# Patient Record
Sex: FEMALE | Race: WHITE | NOT HISPANIC OR LATINO | Employment: FULL TIME | URBAN - METROPOLITAN AREA
[De-identification: names, ages, dates, MRNs, and addresses within clinical notes are randomized per-mention and may not be internally consistent; named-entity substitution may affect disease eponyms.]

---

## 2023-04-22 LAB
ALANINE AMINOTRANSFERASE (SGPT) (U/L) IN SER/PLAS: 77 U/L (ref 7–45)
ALBUMIN (G/DL) IN SER/PLAS: 4.4 G/DL (ref 3.4–5)
ALKALINE PHOSPHATASE (U/L) IN SER/PLAS: 156 U/L (ref 33–110)
ANION GAP IN SER/PLAS: 12 MMOL/L (ref 10–20)
ANION GAP IN SER/PLAS: 14 MMOL/L (ref 10–20)
ASPARTATE AMINOTRANSFERASE (SGOT) (U/L) IN SER/PLAS: 44 U/L (ref 9–39)
BASOPHILS (10*3/UL) IN BLOOD BY AUTOMATED COUNT: 0.09 X10E9/L (ref 0–0.1)
BASOPHILS/100 LEUKOCYTES IN BLOOD BY AUTOMATED COUNT: 1.9 % (ref 0–2)
BILIRUBIN TOTAL (MG/DL) IN SER/PLAS: 0.4 MG/DL (ref 0–1.2)
CALCIUM (MG/DL) IN SER/PLAS: 10.2 MG/DL (ref 8.6–10.6)
CALCIUM (MG/DL) IN SER/PLAS: 10.2 MG/DL (ref 8.6–10.6)
CARBON DIOXIDE, TOTAL (MMOL/L) IN SER/PLAS: 23 MMOL/L (ref 21–32)
CARBON DIOXIDE, TOTAL (MMOL/L) IN SER/PLAS: 24 MMOL/L (ref 21–32)
CHLORIDE (MMOL/L) IN SER/PLAS: 110 MMOL/L (ref 98–107)
CHLORIDE (MMOL/L) IN SER/PLAS: 110 MMOL/L (ref 98–107)
CREATININE (MG/DL) IN SER/PLAS: 0.93 MG/DL (ref 0.5–1.05)
CREATININE (MG/DL) IN SER/PLAS: 0.95 MG/DL (ref 0.5–1.05)
EOSINOPHILS (10*3/UL) IN BLOOD BY AUTOMATED COUNT: 0.24 X10E9/L (ref 0–0.7)
EOSINOPHILS/100 LEUKOCYTES IN BLOOD BY AUTOMATED COUNT: 5.1 % (ref 0–6)
ERYTHROCYTE DISTRIBUTION WIDTH (RATIO) BY AUTOMATED COUNT: 13.2 % (ref 11.5–14.5)
ERYTHROCYTE MEAN CORPUSCULAR HEMOGLOBIN CONCENTRATION (G/DL) BY AUTOMATED: 31.4 G/DL (ref 32–36)
ERYTHROCYTE MEAN CORPUSCULAR VOLUME (FL) BY AUTOMATED COUNT: 95 FL (ref 80–100)
ERYTHROCYTES (10*6/UL) IN BLOOD BY AUTOMATED COUNT: 4.44 X10E12/L (ref 4–5.2)
ESTIMATED AVERAGE GLUCOSE FOR HBA1C: 111 MG/DL
FASTING GLUCOSE (MG/DL) IN SER/PLAS: 94 MG/DL (ref 74–99)
GFR FEMALE: 69 ML/MIN/1.73M2
GFR FEMALE: 71 ML/MIN/1.73M2
GLUCOSE (MG/DL) IN SER/PLAS: 98 MG/DL (ref 74–99)
GLUCOSE (MG/DL) IN SER/PLAS: 98 MG/DL (ref 74–99)
HEMATOCRIT (%) IN BLOOD BY AUTOMATED COUNT: 42 % (ref 36–46)
HEMOGLOBIN (G/DL) IN BLOOD: 13.2 G/DL (ref 12–16)
HEMOGLOBIN A1C/HEMOGLOBIN TOTAL IN BLOOD: 5.5 %
IMMATURE GRANULOCYTES/100 LEUKOCYTES IN BLOOD BY AUTOMATED COUNT: 0.2 % (ref 0–0.9)
INSULIN, FASTING: 10 UIU/ML (ref 3–25)
LEUKOCYTES (10*3/UL) IN BLOOD BY AUTOMATED COUNT: 4.8 X10E9/L (ref 4.4–11.3)
LYMPHOCYTES (10*3/UL) IN BLOOD BY AUTOMATED COUNT: 1.6 X10E9/L (ref 1.2–4.8)
LYMPHOCYTES/100 LEUKOCYTES IN BLOOD BY AUTOMATED COUNT: 33.7 % (ref 13–44)
MONOCYTES (10*3/UL) IN BLOOD BY AUTOMATED COUNT: 0.34 X10E9/L (ref 0.1–1)
MONOCYTES/100 LEUKOCYTES IN BLOOD BY AUTOMATED COUNT: 7.2 % (ref 2–10)
NEUTROPHILS (10*3/UL) IN BLOOD BY AUTOMATED COUNT: 2.47 X10E9/L (ref 1.2–7.7)
NEUTROPHILS/100 LEUKOCYTES IN BLOOD BY AUTOMATED COUNT: 51.9 % (ref 40–80)
NRBC (PER 100 WBCS) BY AUTOMATED COUNT: 0 /100 WBC (ref 0–0)
PLATELETS (10*3/UL) IN BLOOD AUTOMATED COUNT: 243 X10E9/L (ref 150–450)
POTASSIUM (MMOL/L) IN SER/PLAS: 4.6 MMOL/L (ref 3.5–5.3)
POTASSIUM (MMOL/L) IN SER/PLAS: 4.7 MMOL/L (ref 3.5–5.3)
PROTEIN TOTAL: 6.8 G/DL (ref 6.4–8.2)
SODIUM (MMOL/L) IN SER/PLAS: 141 MMOL/L (ref 136–145)
SODIUM (MMOL/L) IN SER/PLAS: 142 MMOL/L (ref 136–145)
THYROTROPIN (MIU/L) IN SER/PLAS BY DETECTION LIMIT <= 0.05 MIU/L: 1.48 MIU/L (ref 0.44–3.98)
UREA NITROGEN (MG/DL) IN SER/PLAS: 22 MG/DL (ref 6–23)
UREA NITROGEN (MG/DL) IN SER/PLAS: 22 MG/DL (ref 6–23)

## 2023-10-26 RX ORDER — SEMAGLUTIDE 2.68 MG/ML
INJECTION, SOLUTION SUBCUTANEOUS
COMMUNITY
Start: 2023-05-02 | End: 2023-10-31 | Stop reason: DRUGHIGH

## 2023-10-26 NOTE — TELEPHONE ENCOUNTER
Spoke with patient, name and  verified:     Patient called stating that a PA needs to be submitted.

## 2023-10-30 PROBLEM — R10.13 ABDOMINAL PAIN, EPIGASTRIC: Status: ACTIVE | Noted: 2023-10-30

## 2023-10-30 PROBLEM — E88.819 INSULIN RESISTANCE: Status: ACTIVE | Noted: 2023-10-30

## 2023-10-30 PROBLEM — F41.9 ANXIETY: Status: ACTIVE | Noted: 2023-10-30

## 2023-10-30 PROBLEM — M62.830 BACK MUSCLE SPASM: Status: ACTIVE | Noted: 2023-10-30

## 2023-10-30 PROBLEM — G89.29 BACK PAIN, CHRONIC: Status: ACTIVE | Noted: 2023-10-30

## 2023-10-30 PROBLEM — R63.5 WEIGHT GAIN: Status: ACTIVE | Noted: 2023-10-30

## 2023-10-30 PROBLEM — K21.9 GERD (GASTROESOPHAGEAL REFLUX DISEASE): Status: ACTIVE | Noted: 2023-10-30

## 2023-10-30 PROBLEM — M54.2 NECK PAIN ON LEFT SIDE: Status: ACTIVE | Noted: 2023-10-30

## 2023-10-30 PROBLEM — E66.3 OVERWEIGHT (BMI 25.0-29.9): Status: ACTIVE | Noted: 2023-10-30

## 2023-10-30 PROBLEM — K22.70 BARRETTS ESOPHAGUS: Status: ACTIVE | Noted: 2023-10-30

## 2023-10-30 PROBLEM — R79.89 ABNORMAL TSH: Status: ACTIVE | Noted: 2023-10-30

## 2023-10-30 PROBLEM — L65.9 HAIR LOSS: Status: ACTIVE | Noted: 2023-01-12

## 2023-10-30 PROBLEM — R06.00 DYSPNEA: Status: ACTIVE | Noted: 2023-10-30

## 2023-10-30 PROBLEM — M54.9 BACK PAIN, CHRONIC: Status: ACTIVE | Noted: 2023-10-30

## 2023-10-30 PROBLEM — K44.9 HERNIA, HIATAL: Status: ACTIVE | Noted: 2023-10-30

## 2023-10-30 PROBLEM — Z72.4 INAPPROPRIATE DIET AND EATING HABITS: Status: ACTIVE | Noted: 2023-10-30

## 2023-10-30 PROBLEM — E03.9 HYPOTHYROIDISM: Status: ACTIVE | Noted: 2023-10-30

## 2023-10-30 PROBLEM — R51.9 HEADACHE: Status: ACTIVE | Noted: 2023-10-30

## 2023-10-30 PROBLEM — L29.9 PRURITUS, UNSPECIFIED: Status: ACTIVE | Noted: 2023-01-12

## 2023-10-30 PROBLEM — M19.90 ARTHRITIS: Status: ACTIVE | Noted: 2023-10-30

## 2023-10-30 PROBLEM — G89.29 CHRONIC RADICULAR PAIN OF LOWER BACK: Status: ACTIVE | Noted: 2023-10-30

## 2023-10-30 PROBLEM — M54.16 CHRONIC RADICULAR PAIN OF LOWER BACK: Status: ACTIVE | Noted: 2023-10-30

## 2023-10-30 PROBLEM — E05.00 GRAVES DISEASE: Status: ACTIVE | Noted: 2023-10-30

## 2023-10-30 PROBLEM — M47.816 SPONDYLOSIS OF LUMBAR SPINE: Status: ACTIVE | Noted: 2023-10-30

## 2023-10-30 PROBLEM — M79.7 FIBROMYALGIA: Status: ACTIVE | Noted: 2023-10-30

## 2023-10-30 PROBLEM — R63.2 POLYPHAGIA: Status: ACTIVE | Noted: 2023-10-30

## 2023-10-30 PROBLEM — M79.18 MUSCULOSKELETAL PAIN: Status: ACTIVE | Noted: 2023-10-30

## 2023-10-30 PROBLEM — R13.10 ODYNOPHAGIA: Status: ACTIVE | Noted: 2023-10-30

## 2023-10-30 PROBLEM — N94.9 CERVICAL MOTION TENDERNESS: Status: ACTIVE | Noted: 2023-10-30

## 2023-10-30 PROBLEM — R00.2 PALPITATIONS: Status: ACTIVE | Noted: 2023-10-30

## 2023-10-30 PROBLEM — L57.0 ACTINIC KERATOSIS: Status: ACTIVE | Noted: 2023-01-12

## 2023-10-30 PROBLEM — H54.7 VISION DECREASED: Status: ACTIVE | Noted: 2023-10-30

## 2023-10-30 PROBLEM — R79.89 ELEVATED LFTS: Status: ACTIVE | Noted: 2023-10-30

## 2023-10-30 PROBLEM — M18.12 LOCALIZED PRIMARY OSTEOARTHRITIS OF CARPOMETACARPAL JOINT OF LEFT THUMB: Status: ACTIVE | Noted: 2023-10-30

## 2023-10-30 PROBLEM — D72.9 ABNORMAL WHITE BLOOD CELL (WBC): Status: ACTIVE | Noted: 2023-10-30

## 2023-10-30 RX ORDER — PHENTERMINE HYDROCHLORIDE 37.5 MG/1
0.5 TABLET ORAL 2 TIMES DAILY
COMMUNITY
Start: 2023-03-13 | End: 2024-01-26 | Stop reason: WASHOUT

## 2023-10-30 RX ORDER — DEXTROMETHORPHAN HYDROBROMIDE, GUAIFENESIN 5; 100 MG/5ML; MG/5ML
650 LIQUID ORAL EVERY 8 HOURS PRN
COMMUNITY
Start: 2023-07-12

## 2023-10-30 RX ORDER — LEVOTHYROXINE SODIUM 125 UG/1
1 TABLET ORAL DAILY
COMMUNITY
Start: 2017-05-08 | End: 2024-01-26 | Stop reason: WASHOUT

## 2023-10-30 RX ORDER — DICLOFENAC EPOLAMINE 0.01 G/1
SYSTEM TOPICAL 2 TIMES DAILY
COMMUNITY

## 2023-10-30 RX ORDER — LIFITEGRAST 50 MG/ML
1 SOLUTION/ DROPS OPHTHALMIC 2 TIMES DAILY
COMMUNITY

## 2023-10-30 RX ORDER — LEVOTHYROXINE SODIUM 88 UG/1
88 TABLET ORAL
COMMUNITY

## 2023-10-30 RX ORDER — SPIRONOLACTONE 50 MG/1
TABLET, FILM COATED ORAL
COMMUNITY
Start: 2021-07-08

## 2023-10-30 RX ORDER — SEMAGLUTIDE 1.34 MG/ML
1 INJECTION, SOLUTION SUBCUTANEOUS
COMMUNITY
End: 2023-10-31 | Stop reason: DRUGHIGH

## 2023-10-30 RX ORDER — PANTOPRAZOLE SODIUM 40 MG/1
40 TABLET, DELAYED RELEASE ORAL 2 TIMES DAILY
COMMUNITY

## 2023-10-30 RX ORDER — CLOBETASOL PROPIONATE 0.46 MG/ML
1 SOLUTION TOPICAL
COMMUNITY
Start: 2021-07-08

## 2023-10-30 RX ORDER — TOPIRAMATE 25 MG/1
3 TABLET ORAL 2 TIMES DAILY
COMMUNITY
Start: 2020-06-10 | End: 2023-11-24 | Stop reason: SDUPTHER

## 2023-10-30 RX ORDER — AMOXICILLIN 500 MG/1
500 CAPSULE ORAL 3 TIMES DAILY
COMMUNITY
Start: 2023-07-12

## 2023-10-30 RX ORDER — SEMAGLUTIDE 0.68 MG/ML
0.5 INJECTION, SOLUTION SUBCUTANEOUS
COMMUNITY
End: 2023-10-31 | Stop reason: DRUGHIGH

## 2023-10-31 ENCOUNTER — TELEMEDICINE (OUTPATIENT)
Dept: SURGERY | Facility: CLINIC | Age: 58
End: 2023-10-31
Payer: COMMERCIAL

## 2023-10-31 VITALS — BODY MASS INDEX: 27.9 KG/M2 | WEIGHT: 155 LBS

## 2023-10-31 DIAGNOSIS — Z72.4 INAPPROPRIATE DIET AND EATING HABITS: ICD-10-CM

## 2023-10-31 DIAGNOSIS — E88.819 INSULIN RESISTANCE: Primary | ICD-10-CM

## 2023-10-31 DIAGNOSIS — R63.2 POLYPHAGIA: ICD-10-CM

## 2023-10-31 DIAGNOSIS — E66.3 OVERWEIGHT (BMI 25.0-29.9): ICD-10-CM

## 2023-10-31 PROCEDURE — 99214 OFFICE O/P EST MOD 30 MIN: CPT

## 2023-10-31 PROCEDURE — 99214 OFFICE O/P EST MOD 30 MIN: CPT | Mod: 95

## 2023-10-31 RX ORDER — SEMAGLUTIDE 2.68 MG/ML
2 INJECTION, SOLUTION SUBCUTANEOUS
Qty: 3 ML | Refills: 2 | Status: SHIPPED | OUTPATIENT
Start: 2023-10-31 | End: 2024-01-26 | Stop reason: WASHOUT

## 2023-10-31 NOTE — PATIENT INSTRUCTIONS
# Insulin Resistance/Metabolic Syndrome:  - Your recent labs show insulin resistance of which a diagnosis of Metabolic Syndrome is supported   - Metabolic Syndrome is an important risk factor for the future development of Diabetes and/or Cardiovascular Disease.  - Metabolic Syndrome has also been associated with other obesity-related disorders including: Fatty Liver Disease (Steatosis, Fibrosis and Cirrhosis), Hepatocellular Carcinoma, Chronic Kidney Disease, Obstructive Sleep Apnea, Gout, Cognitive Decline and Dementia.   - Aggressive lifestyle modification focusing on weight reduction and increased physical activity is the primary therapy for the management of Metabolic Syndrome.  - Weight reduction is optimally achieved with a multimodality approach including diet, exercise and possible pharmacologic therapy.     # Diet Recommendations:  - Keep a food journal and log everything you eat and drink. You can use a phone applications like [employer], [employer], a notebook, or the provided meal calendar.   - Eat between 8810-0165 calories per day; meal plan provided to you this visit.  - Consume less than 100 carbohydrates per day. Examples of carbohydrates include: bread, pasta, cakes, cookies, rice, cereal, fruit drinks, juice, soda and fruit.   - Consume no more than 1 fruit per day.      - Eat 3 meals and 1 snack. Each meal should have 3-4 oz of protein and vegetables. Limit starches.  - Eat on a schedule and do not skip meals.     # Exercise Recommendations:   - Aim for 30 minutes per day, 5 days per week to start, with progression over several weeks to more vigorous intensity.   - Emphasize increasing duration, rather than intensity initially.   - Moderate-intensity physical activity includes:  - Brisk Walking  - Biking (slower than 10 MPH)  - Water Aerobics  - Vigorous housework (washing windows, vacuuming, mopping)  - Mowing the lawn (push mower)  - Gardening  - Ballroom dancing     # Medication:   - You have  been prescribed Ozempic for the treatment of Insulin Resistance and Metabolic Syndrome.  - Ozempic is a Glucagon-like peptide-1 (GLP-1) receptor agonist indicated as an adjunct to a reduced calorie diet and increased physical activity for chronic weight management in adults.   - This medication works by decreasing the appetite and increased feeling of fullness.   - Administer Ozempic once weekly, on the same day each week, at any time of day, with or without meals.   - Inject subcutaneously in the abdomen, thigh or upper arm.  - Possible Side Effects Include: Nausea, Diarrhea, Vomiting, Constipation, Abdominal Pain, Headache, Fatigue, Dyspepsia, Dizziness, Abdominal Distention, Hypoglycemia (in patients with Type II Diabetes), flatulence, gastroenteritis, and GERD.     *For Constipation--> take a fiber supplement or stool softener daily. Make sure you are drinking at least 64oz of water daily.  *For Nausea--> Eat small, frequent meals throughout the day. If nausea persists, please call the office.     Please call my , Bella, to schedule your follow up appointment at [phone]. Please call today to ensure your follow up appointment is scheduled at the appropriate interval.        Follow-up in 4-6 weeks.

## 2023-11-07 NOTE — TELEPHONE ENCOUNTER
Spoke with patient, name and  verified:     Patient is aware that Ozempic was denied by insurance and would like no know next steps going forward. Patient is suggesting trying to restart Topiramate.     Please advise.

## 2023-11-14 RX ORDER — SEMAGLUTIDE 2.68 MG/ML
INJECTION, SOLUTION SUBCUTANEOUS
Refills: 0 | OUTPATIENT
Start: 2023-11-14

## 2023-11-18 ENCOUNTER — PATIENT MESSAGE (OUTPATIENT)
Dept: SURGERY | Facility: HOSPITAL | Age: 58
End: 2023-11-18
Payer: COMMERCIAL

## 2023-11-18 DIAGNOSIS — R63.2 POLYPHAGIA: Primary | ICD-10-CM

## 2023-11-18 DIAGNOSIS — E66.3 OVERWEIGHT (BMI 25.0-29.9): ICD-10-CM

## 2023-11-24 RX ORDER — TOPIRAMATE 25 MG/1
50 TABLET ORAL 2 TIMES DAILY
Qty: 120 TABLET | Refills: 1 | Status: SHIPPED | OUTPATIENT
Start: 2023-11-24 | End: 2023-12-28 | Stop reason: SDUPTHER

## 2023-12-28 ENCOUNTER — TELEMEDICINE (OUTPATIENT)
Dept: SURGERY | Facility: CLINIC | Age: 58
End: 2023-12-28
Payer: COMMERCIAL

## 2023-12-28 ENCOUNTER — PATIENT MESSAGE (OUTPATIENT)
Dept: SURGERY | Facility: CLINIC | Age: 58
End: 2023-12-28

## 2023-12-28 VITALS — HEIGHT: 63 IN | BODY MASS INDEX: 28.7 KG/M2 | WEIGHT: 162 LBS

## 2023-12-28 DIAGNOSIS — R63.2 POLYPHAGIA: ICD-10-CM

## 2023-12-28 DIAGNOSIS — E66.3 OVERWEIGHT (BMI 25.0-29.9): ICD-10-CM

## 2023-12-28 DIAGNOSIS — E66.3 OVERWEIGHT (BMI 25.0-29.9): Primary | ICD-10-CM

## 2023-12-28 DIAGNOSIS — E88.819 INSULIN RESISTANCE: ICD-10-CM

## 2023-12-28 PROCEDURE — 99213 OFFICE O/P EST LOW 20 MIN: CPT

## 2023-12-28 RX ORDER — TOPIRAMATE 50 MG/1
50 TABLET, FILM COATED ORAL 2 TIMES DAILY
Qty: 60 TABLET | Refills: 1 | Status: SHIPPED | OUTPATIENT
Start: 2023-12-28 | End: 2024-01-03 | Stop reason: DRUGHIGH

## 2023-12-28 NOTE — ASSESSMENT & PLAN NOTE
Your recent lab results are consistent with features of insulin resistance risk, a disease process on the spectrum of conditions which can progress to type 2 diabetes  -counseled on lifestyle modification focusing on nutritional changes to reduce excess calorie intake, reduce ultra processed carbohydrates and added sugars in the diet, weight reduction, increased physical activity, improved sleep management and stress reduction are the primary therapies for the management of this diagnosis.  -dietary counseling for use of a controlled carbohydrate diet approach, emphasizing a focus on increased dietary protein, high fiber, whole food based carbohydrate sources (foods like vegetables, fruits, beans, lentils, 100% whole grain, etc).  -  Continue to strive to increase dietary protein at each meal.  Goal protein intake 30g per meal.  Balancing grams of carbohydrate and protein in 1:1 ratio at meals can help keep blood sugar stable after meals (for example 30g protein matched to 30-40g carbohydrate) - working with a clinical dietitian can help teach you about carbohydrate counting at meals to get you more comfortable with understanding how to appropriately track carbohydrates at meals.     -Exercise Counseling: Increase purposeful physical activity to improve insulin sensitivity.

## 2023-12-28 NOTE — PROGRESS NOTES
[facility]  Comprehensive Weight Management        Patient: Ruth Wynne  MRN: [id number]    Virtual or Telephone Consent     A telephone visit (audio or visual only) between the patient (at the originating site) and the provider (at the distant site) was utilized to provide this telehealth service. Verbal consent was requested and obtained from Ruth Wynne on this date, 12/28/2023 at 1:15 PM for a telehealth visit.     HPI   Ruth Wynne is a 58 y.o., female patient presenting for comprehensive weight management follow up visit.  Patient regularly sees and has established care with KANWAL Quiroga. Visit today for medication refill.  PMH - Obesity, Anxiety, Arthritis, Back Pain, Elevated LFT's, Fibromyalgia, GERD, Graves Disease, HH and Polyphagia.     Obesity Hx:  Onset of Obesity: adulthood  Previous Weight Management Attempted:  -Used to be on topiramate 75mg BID. Last visit endorsed not taking topiramate as consistently and experienced some weight regain.   -Phentermine was initiated last month, but Ruth states that she did not like the way it made her feel,. and she was having some chest pain along with exertion with activity. Stopped.   -Ozempic to address her Insulin Resistance. No longer covered by insurance    Most Weight Loss Success: with pharmacological adjunct to diet / exercise   Self-ID Dietary Challenge Areas: evening cravings / snacks   Frequency Added Sugar Beverages: denies  Exercise: 8K/day steps daily, minimal regular exercise     Metabolic/Clinical health risks associated with obesity & Factors influence Obesity Treatment:    -insulin resistance    Primary Medical Hx:  Patient Active Problem List   Diagnosis    Abdominal pain, epigastric    Abnormal TSH    Abnormal white blood cell (WBC)    Actinic keratosis    Anxiety    Arthritis    Back muscle spasm    Back pain, chronic    Fibromyalgia    Headache    Musculoskeletal pain    Neck pain on left side    Barretts esophagus     Cervical motion tenderness    Chronic radicular pain of lower back    Dyspnea    Elevated LFTs    GERD (gastroesophageal reflux disease)    Hernia, hiatal    Graves disease    Hair loss    Hypothyroidism    Insulin resistance    Localized primary osteoarthritis of carpometacarpal joint of left thumb    Odynophagia    Overweight (BMI 25.0-29.9)    Palpitations    Polyphagia    Pruritus, unspecified    Spondylosis of lumbar spine    Vision decreased    Weight gain    Inappropriate diet and eating habits        Past Surgical History:   Procedure Laterality Date     SECTION, CLASSIC  2017     Section    ESOPHAGOGASTRODUODENOSCOPY  2017    Diagnostic Esophagogastroduodenoscopy    OTHER SURGICAL HISTORY  06/10/2020    Complete colonoscopy    TOTAL THYROIDECTOMY  2017    Thyroid Surgery Total Thyroidectomy      Social Hx:  -Sleep patterns: 7 hrs / night   -Alcohol: denies use  -Tobacco: never / denies use  -Recreational Drugs: never / denies use    Family History   Problem Relation Name Age of Onset    Other (colon abnormality) Mother      COPD Mother      Hypertension Mother      Lung cancer Father      Ovarian cancer Sister      Breast cancer Maternal Grandmother      Arthritis Other        Current Outpatient Medications on File Prior to Visit   Medication Sig Dispense Refill    acetaminophen (Tylenol 8 HOUR) 650 mg ER tablet Take 1 tablet (650 mg) by mouth every 8 hours if needed (postop pain).      amoxicillin (Amoxil) 500 mg capsule Take 1 capsule (500 mg) by mouth 3 times a day. UNTIL FINISHED      clobetasol (Temovate) 0.05 % external solution Apply 1 Application topically.      diclofenac epolamine 1.3 % patch Place on the skin 2 times a day. TO AFFECTED AREA      DICLOFENAC SODIUM TOP Apply topically. Diclofenac Sodium CREAM      levothyroxine (Synthroid, Levoxyl) 125 mcg tablet Take 1 tablet (125 mcg) by mouth once daily.      levothyroxine (Synthroid, Levoxyl) 88 mcg tablet  Take 1 tablet (88 mcg) by mouth once daily in the morning. Take before meals. ON AN EMPTY STOMACH      Ozempic 2 mg/dose (8 mg/3 mL) pen injector Inject 2 mg under the skin 1 (one) time per week. 3 mL 2    pantoprazole (ProtoNix) 40 mg EC tablet Take 1 tablet (40 mg) by mouth 2 times a day.      phentermine (Adipex-P) 37.5 mg tablet Take 0.5 tablets (18.75 mg) by mouth 2 times a day.      spironolactone (Aldactone) 50 mg tablet Take by mouth.      Xiidra 5 % dropperette Administer 1 drop into both eyes 2 times a day.      [DISCONTINUED] topiramate (Topamax) 25 mg tablet Take 2 tablets (50 mg) by mouth 2 times a day. 120 tablet 1     No current facility-administered medications on file prior to visit.     Allergies   Allergen Reactions    Codeine Itching and Nausea/vomiting      REVIEW OF SYSTEMS:  Negative unless otherwise reviewed in HPI.    PHYSICAL EXAM   Physical Exam   Wt: 162 BMI: 29.16  General- No acute distress, well appearing and well nourished.   Eyes Conjunctiva and lids: No erythema, swelling or discharge  Neck appears supple  CV: regular rate / reg rhythm  Pulmonary: Respiratory effort: Normal respiration. unlabored  Abdomen: Central adiposity  Neurologic - Coordination: gait appears normal   Extremities: No clubbing, cyanosis  Psychiatric - Orientation to person, place, and time: Normal. Mood and affect: Normal.    ASSESSMENT & PLAN  Ruth Wynne is presenting for a comprehensive obesity management follow up visit.  Patient has been having appetite suppressant effect from Topamax.  Reports doing well with diet and exercise, some poor dietary choices over holiday but back on track now.  Requests dose increase of medication at today's visit.  Risks, benefits, contraindications discussed and all patient questions answered.  Patient has previously taken higher dose of medication, again without negative side effects.      Weight Impression:  -Current BMI: 29.16  -Initial Weight: 179 lbs  -Today's Weight:  160 --> 162  -%Total Weight Loss: 17 lbs    Plan:  - Continue Topiramate off label for obesity treatment at increased dose.   We have reviewed therapies which are FDA approved for chronic weight management as well.    The use of Topiramate may help decrease appetite, reduce cravings and improve eating related behaviors.    Possible Side effects include: tingling of the arms and legs (paresthesia), nausea, a change in the way foods taste, diarrhea, nervousness, and upper respiratory tract infection.    Problem List Items Addressed This Visit       Insulin resistance     Your recent lab results are consistent with features of insulin resistance risk, a disease process on the spectrum of conditions which can progress to type 2 diabetes  -counseled on lifestyle modification focusing on nutritional changes to reduce excess calorie intake, reduce ultra processed carbohydrates and added sugars in the diet, weight reduction, increased physical activity, improved sleep management and stress reduction are the primary therapies for the management of this diagnosis.  -dietary counseling for use of a controlled carbohydrate diet approach, emphasizing a focus on increased dietary protein, high fiber, whole food based carbohydrate sources (foods like vegetables, fruits, beans, lentils, 100% whole grain, etc).  -  Continue to strive to increase dietary protein at each meal.  Goal protein intake 30g per meal.  Balancing grams of carbohydrate and protein in 1:1 ratio at meals can help keep blood sugar stable after meals (for example 30g protein matched to 30-40g carbohydrate) - working with a clinical dietitian can help teach you about carbohydrate counting at meals to get you more comfortable with understanding how to appropriately track carbohydrates at meals.     -Exercise Counseling: Increase purposeful physical activity to improve insulin sensitivity.              Overweight (BMI 25.0-29.9) - Primary     - continue topamax off  label for obesity management.  - discussed risks/benefits, contraindications to use  -patient reports no negative side effects of medication use, requests dose increase at today's visit  -follow up with KANWAL NICO Quiroga in four weeks         Relevant Medications    topiramate (Topamax) 50 mg tablet    Polyphagia     -Increase Topamax from 25 mg bid to 50 mg bid  -Medication resource provided in AVS         Relevant Medications    topiramate (Topamax) 50 mg tablet      Please see Patient Instructions for today's relevant referrals, orders and instructions.  > 50% of time spent counseling on lifestyle modifications to support weight loss.      Follow Up: Follow up in about 4 weeks (around 1/25/2024).     Alee Dolan, APRN-CNP

## 2023-12-28 NOTE — PATIENT INSTRUCTIONS
You have been prescribed Topiramate for obesity treatment.   This medication is used off label for obesity treatment.  We have reviewed therapies which are FDA approved for chronic weight management as well.      The use of Topiramate may help decrease appetite, reduce cravings and improve eating related behaviors.      IMPORTANT Lifestyle Management with the use of anti-obesity medication include:  - Monitor/track your protein intake, ensure at least 80-90g of protein per day for women, 110-120g for men -> this helps to ensure you are consuming adequate protein to maintain/preserve lean body mass in weight loss setting.  - Consume at least 64 oz of water per day -> this helps to reduce the risk of constipation/dehydration associated with this medication  - Engage in resistance at least 2x/week - targeting upper & lower body group with each resistance training session -> this helps to ensure you are consuming adequate protein to maintain/preserve lean body mass in weight loss setting.    Possible Side effects include: tingling of the arms and legs (paresthesia), nausea, a change in the way foods taste, diarrhea, nervousness, and upper respiratory tract infection.  --> Topiramate can increase the risk of kidney stone formation.  Kidney stone prevention includes drinking 2-3L of non caffeinated, non carbonated zero calorie beverages per day.  --> Topiramate can cause mood changes and suicidal thoughts in some individuals, if you have mood or behavior changes you must notify the provider immediately to discuss medication discontinuation.      FOR WOMEN OF REPRODUCTIVE AGE:  You must use a reliable method of contraception while taking this medication as use of Topiramate during pregnancy has been associated with birth defects including cleft lip/palate.  Medication must be discontinued if planning for pregnancy.  Home pregnancy testing to confirm negative pregnancy status is requested monthly while using this medication  if of child bearing age in addition to reliable contraceptive method.           Schedule Referrals placed today by calling [phone]  If lab work ordered at your visit today please complete prior to next appointment at any  lab facility.     Lab results with be communicated via  [facility] MYChart.  If you need assistance setting up your  [facility]: [phone]    Diet Pattern to support weight loss:   Implement a structured meal plan.  Aim for 3 meals per day, focus on the health quality of foods.   Minimize snacking between meals unless experiencing hunger - reflect on why you are snacking prior to eating, this is called mindful eating & can help identify eating for hunger or for other reason (bored, emotional, etc).    Use of self monitoring practices by keeping a food log helps increase your awareness of what you are eating, calorie density of foods, accurate portion sizes, why you are eating, when you are eating, etc.  Use as an analysis tool to better understand behaviors that impact your diet.  Can be used to set goals around dietary changes.     Tracking your total daily calorie intake over the course of 2-4 weeks and comparing that to your weekly weight average can provide real time data on if the average daily calorie intake is supporting weight loss, maintenance or gain over this time frame.     Women will benefit from a calorie target of 6200-2764 per day for weight loss depending on activity level  Men will benefit from a calorie target of 7640-1925 per day for weight loss depending on activity level.     Optimizing protein & dietary fiber can help sustainability of diet changes due to improved satiety (fullness).    Protein goal (Aim for at least 0.7g protein per pound of ideal body weight to help maintain lean body mass in setting of weight loss).    For most women this will be at least approximately 80-90g+ of protein per day depending on activity levels.  For most men this will be approximately  "100-120g+ of protein per day depending on activity levels    Dietary Fiber goal of 25-30g per day.   Ideally obtained through plentiful additions of vegetables, fruits, 100% whole grains, beans, lentils, etc.  A psyllium husk fiber supplement can be used to increase fiber intake and may benefit increased fullness if taken as 1 scoop of fiber powder in 8-12 oz of water 30 minutes prior to a meal 1-2 times per day.    FOR MEALS:   Use an 8 inch plate to help with appropriate portion size.  1/4 plate protein, ensure at least 4oz portion of protein for 25-30g protein goal per meal --> \"palm of hand size portion\".    1/2 the plate vegetables or fruit --> Daily goal is 5 or MORE servings of fruit and vegetables per DAY  1 serving = 1 cup non starchy vegetables (all veggies except corn, peas, potatoes), 1/2 cup starchy veggies (corn, peas, potatoes), 1/2 cup fruit or 1 small whole piece of fruit (tennis ball size)  Use recommend serving size for whole grain starches - typically 1/3 - 1/2 cup COOKED whole grains such as rice, beans, lentils, quinoa, etc.      FOR SNACKS:   Include protein can help better control hunger. Aim for 10-15g protein with snacks.    Be sure you are using calorie controlled snacks.  Include a fruit or vegetable serving with snacks to increase dietary fiber which helps you stay full longer between meals.      DO NOT DRINK YOUR CALORIES, choose water or zero calorie beverages - aim for 64oz or more of non carbonated, non caffeinated calorie free beverages per day.    EXERCISE GOALS:   Please track your physical activity - Goal is to increase aerobic exercise to at least 150-300 minutes per week (2.5-5 hours).  Strength training is also beneficial for weight loss, maintaining muscle mass and health improvement.    2 sessions per week of resistance training is encouraged to maintain or build muscle mass.  Increase your exercise duration and intensity over time.  Build up slowly to ensure appropriate " recovery and avoid injury.      Intensification Beyond Lifestyle Management  Chronic obesity medications are FDA approved to be used as treatment intensification WITH LIFESTYLE modification.    Weight management medications help to improve appetite control and can help make it easier to maintain a reduced calorie diet to support weight loss.    Please check with your insurance prior to our next appointment to determine if any of the following medications are covered by your insurance plan for the diagnosis of Obesity.  Other medications in similar classes may be covered for Diabetes but you need a diagnosis of Type 2 Diabetes for these medications to be covered.    Zepbound - injectable medication (weekly), helps your brain better recognize fullness signals to help you reduce calorie intake, also can help with craving reduction.  Can help to improve blood sugar and reduce cardiovascular risk.    Wegovy - injectable medication (weekly), helps your brain better recognize fullness signals to help you reduce calorie intake, also can help with craving reduction.  Can help to improve blood sugar and reduce cardiovascular risk. - Currently struggling with supply of this medication nationally.      Saxenda - injectable medication (daily), helps your brain better recognize fullness signals to help you reduce calorie intake, also can help with craving reduction.  Can help to improve blood sugar and reduce cardiovascular risk. - Currently struggling with supply of this medication nationally.    Contrave - oral (pill) medication taken twice daily, helps better regulate eating behaviors, lessens emotional & cravings triggers for eating  --> Has preferred pricing mail order program approx $99/mo through [facility] if not covered by insurance    Qsymia (Phentermine/Topiramate) - once daily oral capsule (pill) taken in the morning.  May help to reduce cravings and appetite.  This medication is a controlled substance and  requires special prescribing and follow up due to this.  Does have preferred pricing mail order program approx $99/month if not covered by insurance through [facility] program.        If appointment was not scheduled before leaving today please call [phone].  FOR VIRTUAL VISITS PLEASE BE AWARE OF THE FOLLOWING:    - You need a reliable scale to weigh yourself at home for follow up visits  - You need a blood pressure cuff to monitor blood pressure & heart rate at home (can be purchased over the counter, on Amazon, drug store, etc).    - For virtual visits you  must be in the State of [place]  - YOU CANNOT BE DRIVING, please remember this is an appointment and should be treated the same as if you were in the office for follow up appointment.

## 2024-01-03 DIAGNOSIS — E66.3 OVERWEIGHT (BMI 25.0-29.9): ICD-10-CM

## 2024-01-03 DIAGNOSIS — R63.2 POLYPHAGIA: ICD-10-CM

## 2024-01-03 RX ORDER — TOPIRAMATE 25 MG/1
25 TABLET ORAL 4 TIMES DAILY
Qty: 120 TABLET | Refills: 0 | Status: SHIPPED | OUTPATIENT
Start: 2024-01-03 | End: 2024-01-04

## 2024-01-04 RX ORDER — TOPIRAMATE 25 MG/1
25 TABLET ORAL 4 TIMES DAILY
Qty: 120 TABLET | Refills: 2 | Status: SHIPPED | OUTPATIENT
Start: 2024-01-04 | End: 2024-01-26 | Stop reason: SDUPTHER

## 2024-01-10 ENCOUNTER — OFFICE VISIT (OUTPATIENT)
Dept: DERMATOLOGY | Facility: CLINIC | Age: 59
End: 2024-01-10
Payer: COMMERCIAL

## 2024-01-10 DIAGNOSIS — L59.0 ERYTHEMA AB IGNE: ICD-10-CM

## 2024-01-10 DIAGNOSIS — L85.3 XEROSIS CUTIS: ICD-10-CM

## 2024-01-10 DIAGNOSIS — D18.01 HEMANGIOMA OF SKIN: ICD-10-CM

## 2024-01-10 DIAGNOSIS — L81.4 LENTIGO: ICD-10-CM

## 2024-01-10 DIAGNOSIS — L30.9 HAND DERMATITIS: ICD-10-CM

## 2024-01-10 DIAGNOSIS — Z12.83 ENCOUNTER FOR SCREENING FOR MALIGNANT NEOPLASM OF SKIN: Primary | ICD-10-CM

## 2024-01-10 DIAGNOSIS — L82.1 SEBORRHEIC KERATOSIS: ICD-10-CM

## 2024-01-10 PROCEDURE — 99214 OFFICE O/P EST MOD 30 MIN: CPT | Performed by: NURSE PRACTITIONER

## 2024-01-10 RX ORDER — AMMONIUM LACTATE 12 G/100G
CREAM TOPICAL
Qty: 385 G | Refills: 9 | Status: SHIPPED | OUTPATIENT
Start: 2024-01-10

## 2024-01-10 NOTE — PROGRESS NOTES
Subjective     Radha Wynne is a 58 y.o. female who presents for the following: Suspicious Skin Lesion (Presents for examination of lesions to face. Denies pain, itching or bleeding.).     Review of Systems:  No other skin or systemic complaints other than what is documented elsewhere in the note.    The following portions of the chart were reviewed this encounter and updated as appropriate:  Tobacco  Allergies  Meds  Problems  Med Hx  Surg Hx  Fam Hx         Skin Cancer History  No skin cancer on file.      Specialty Problems          Dermatology Problems    Actinic keratosis    Hair loss    Pruritus, unspecified        Objective   Well appearing patient in no apparent distress; mood and affect are within normal limits.    A full examination was performed including scalp, head, eyes, ears, nose, lips, neck, chest, axillae, abdomen, back, buttocks, bilateral upper extremities, bilateral lower extremities, hands, feet, fingers, toes, fingernails, and toenails. All findings within normal limits unless otherwise noted below.    Assessment/Plan   1. Encounter for screening for malignant neoplasm of skin  Scattered benign lesions    - Protective measures, such as avoiding skin exposure to sunlight during peak sun hours (10 AM to 3 PM), wearing protective clothing, and applying high-SPF sunscreen, are essential for reducing exposure to harmful ultraviolet (UV) light.  - Monthly self-examination of the skin is helpful to detect new lesions or changes in existing lesions.  - Discussed signs and symptoms of sun-related skin cancers.   - Make sure your moles are not signs of skin cancer (melanoma). Remember the ABCDEs of melanoma lesions:  A - Asymmetry: One half of the lesion does not mirror the other half.  B - Border: The borders are irregular or vague (indistinct).  C - Color: More than one color may be noted within the mole.  D - Diameter: Size greater than 6 mm (roughly the size of a pencil eraser) may be  "concerning.  E - Evolving: Notable changes in the lesion over time are suspicious signs for skin cancer.    2. Hemangioma of skin  Violaceous/red papule with maroon lagoons     - A cherry hemangioma is a small macule (small, flat, smooth area) or papule (small, solid bump) formed from an overgrowth of tiny blood vessels in the skin. Cherry hemangiomas are characteristically red or purplish in color. They often first appear in middle adulthood and usually increase in number with age. Cherry hemangiomas are noncancerous (benign) and are common in adults.  - Lesions are benign, reassured patient.     3. Lentigo  Scattered tan macules in sun-exposed areas.    A solar lentigo (plural, solar lentigines), sometimes called an age spot or liver spot, is a brown macule (small, flat, smooth area of skin) caused by chronic sun or artificial ultraviolet (UV) light exposure. There may be just one lentigo or there may be multiple. This type of lentigo is different from lentigo simplex (discussed separately) because it is caused by exposure to UV light. Solar lentigines are benign, but they do indicate excessive sun exposure, a risk factor for the development of skin cancer.  Lesions are benign, no treatment needed.     4. Seborrheic keratosis  Stuck on verrucous, tan-brown papules and plaques.      Although Seborrheic Keratoses can be troublesome and unsightly, they are entirely benign.  Removal of Seborrheic Keratoses is considered a cosmetic procedure. Removal is typically performed using liquid nitrogen cryotherapy.  Treatment of current lesions does not prevent the development of new Seborrheic Keratoses in the future.    5. Erythema ab igne  Torso - Posterior (Back)  Reticular hyperpigmentation in areas where heating pad is used (daily)    Erythema ab igne (EAI), which literally means \"redness from fire\" in Latin, is a disorder of hyperpigmentation caused by long-term exposure to heat. This exposure produces cutaneous " hyperthermia, which in turn results in histopathologic changes similar to those seen in sun-damaged skin. Prolonged use of hot water bottles, heating pads, electric blankets, or sitting near a wood stove or fireplace can trigger the formation of this disorder. EAI has also reportedly been caused by sitting for long periods with a laptop computer on the lap.      6. Hand dermatitis  Left Hand - Anterior, Right Hand - Anterior  Erythema, scale and fissures on palms and fingers    Hand dermatitis  - This can be caused by irritants, allergies, frequent water exposure, or skin disease. Hand dermatitis may cause itching, swelling, or blisters. Without treatment, the skin may become thick, cracked, or scaly, and bleed.  - Most of the time, hand dermatitis is caused by coming into contact with something that irritates the skin, such as chemicals, or contact with something that causes allergic reactions in some people, such as latex gloves or poison ivy. Work that requires keeping your hands wet can also irritate the skin of your palms.  - Avoid the environmental trigger that is causing the reaction. Wear gloves for work that involves chemicals or submerging your hands in water.  Plan  - Regular use of moisturizer or petroleum jelly, especially after bathing and hand washing, can reduce irritation.  - Wash hands in cold water, if possible.    - Start triamcinolone cream (can use son's prescription), use as directed. Please call if you need refills  -  Risks, benefits, side effects, alternatives and options were discussed with patient and the patient voiced understanding.      7. Xerosis cutis  Fine, ashy, pale to white scale diffusely over skin.    Often itchy, dry skin is caused by environmental factors, such as cold weather and frequent bathing, and by medical conditions, such as atopic dermatitis and malnutrition. Dry skin develops due to a decrease in the natural oils in the outer layer of skin, which makes the skin lose  water.    Healthy bathing habits can improve dry skin:  - Take a bath or shower only once daily. More frequent bathing can make the skin lose water (dehydrate).   - Use lukewarm (not hot) water.   - Limit bath time to 15 minutes.   - Avoid harsh deodorant soaps (or limit their use to armpits, groin, and feet).   - Use non-soap cleansers.    -Pat (don't rub) the skin dry after bathing.   - Apply moisturizer immediately after bathing, while the skin is still moist.   - When choosing a moisturizer, look for oil-based creams and ointments, which work better than water-based lotions.     The following over-the-counter products may be helpful:  - Petrolatum or petroleum jelly (Vaseline)   - Fragrance-free creams or ointments   - Preparations containing alpha-hydroxy acids such as glycolic acid or lactic acid   - Creams containing urea   - Over-the-counter cortisone cream (if the areas are itchy)   - Topical antibiotics applied immediately to any cracks in the skin to help prevent infection    Plan  - Follow abovementioned guidelines.   - Start ammonium lactate, use as directed.   - Risks, benefits, side effects, alternatives and options were discussed with patient and the patient voiced understanding.

## 2024-01-26 ENCOUNTER — TELEMEDICINE (OUTPATIENT)
Dept: SURGERY | Facility: HOSPITAL | Age: 59
End: 2024-01-26
Payer: COMMERCIAL

## 2024-01-26 VITALS — HEIGHT: 62 IN | BODY MASS INDEX: 29.81 KG/M2 | WEIGHT: 162 LBS

## 2024-01-26 DIAGNOSIS — E88.819 INSULIN RESISTANCE: ICD-10-CM

## 2024-01-26 DIAGNOSIS — R63.2 POLYPHAGIA: ICD-10-CM

## 2024-01-26 DIAGNOSIS — E66.3 OVERWEIGHT (BMI 25.0-29.9): Primary | ICD-10-CM

## 2024-01-26 PROCEDURE — 99214 OFFICE O/P EST MOD 30 MIN: CPT | Mod: 95,ZK

## 2024-01-26 PROCEDURE — 99214 OFFICE O/P EST MOD 30 MIN: CPT

## 2024-01-26 RX ORDER — METFORMIN HYDROCHLORIDE 500 MG/1
500 TABLET, EXTENDED RELEASE ORAL
Qty: 30 TABLET | Refills: 1 | Status: SHIPPED | OUTPATIENT
Start: 2024-01-26 | End: 2024-04-05

## 2024-01-26 RX ORDER — TOPIRAMATE 100 MG/1
100 TABLET, FILM COATED ORAL 2 TIMES DAILY
Qty: 180 TABLET | Refills: 1 | Status: SHIPPED | OUTPATIENT
Start: 2024-01-26 | End: 2024-07-24

## 2024-01-26 NOTE — PATIENT INSTRUCTIONS
# Insulin Resistance/Metabolic Syndrome:  - Your recent labs show insulin resistance of which a diagnosis of Metabolic Syndrome is supported   - Metabolic Syndrome is an important risk factor for the future development of Diabetes and/or Cardiovascular Disease.  - Metabolic Syndrome has also been associated with other obesity-related disorders including: Fatty Liver Disease (Steatosis, Fibrosis and Cirrhosis), Hepatocellular Carcinoma, Chronic Kidney Disease, Obstructive Sleep Apnea, Gout, Cognitive Decline and Dementia.   - Aggressive lifestyle modification focusing on weight reduction and increased physical activity is the primary therapy for the management of Metabolic Syndrome.  - Weight reduction is optimally achieved with a multimodality approach including diet, exercise and possible pharmacologic therapy.    # Diet Recommendations:  - Keep a food journal and log everything you eat and drink. You can use a phone applications like Samanage, TinderBox, a notebook, or the provided meal calendar.   - Eat between 1902-6450 calories per day; meal plan provided to you this visit.  - Consume less than 100 carbohydrates per day. Examples of carbohydrates include: bread, pasta, cakes, cookies, rice, cereal, fruit drinks, juice, soda and fruit.   - Consume no more than 1 fruit per day.   - Eat 3 meals and 1 snack. Each meal should have 3-4 oz of protein and vegetables. Limit starches.  - Eat on a schedule and do not skip meals.    # Exercise Recommendations:   - Aim for 30 minutes per day, 5 days per week to start, with progression over several weeks to more vigorous intensity.   - Emphasize increasing duration, rather than intensity initially.   - Moderate-intensity physical activity includes:  - Brisk Walking  - Biking (slower than 10 MPH)  - Water Aerobics  - Vigorous housework (washing windows, vacuuming, mopping)  - Mowing the lawn (push mower)  - Gardening  - Ballroom dancing    # Medication:   - You have been  prescribed Metformin for the treatment of Insulin Resistance and Metabolic Syndrome.  - Metformin is indicated as an adjunct to a reduced calorie diet and increased physical activity for chronic weight management in adults.   - Take 1 tablet (500 mg) every day with meal.   Possible side effects are: Diarrhea, Nausea & Vomiting, Headache, Sweating, Fatigue &/or Hypoglycemia.     *For Constipation--> take a fiber supplement or stool softener daily. Make sure you are drinking at least 64oz of water daily.  *For Nausea--> Eat small, frequent meals throughout the day. If nausea persists, please call the office.    You have been prescribed Topiramate for weight loss.   This medication will decrease your appetite.    Possible Side Effects include: tingling of the arms and legs, nausea, a change in the way foods taste, diarrhea, nervousness, fogginess, upper respiratory tract infection.     This medication is absolutely contraindicated in pregnancy. Topiramate has been associated with the development of cleft palate as well as cleft lip in neonates. These developmental abnormalities often occur before a woman even knows she is pregnant. Some form of birth control must be utilized (oral contraceptives or barrier method, etc) while taking this medication.       Please call my , Bella, to schedule your follow up appointment at [phone].  Please call today to ensure your follow up appointment is scheduled at the appropriate interval.      Follow-up in 2 months.
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
## 2024-04-03 DIAGNOSIS — E88.819 INSULIN RESISTANCE: ICD-10-CM

## 2024-04-05 RX ORDER — METFORMIN HYDROCHLORIDE 500 MG/1
500 TABLET, EXTENDED RELEASE ORAL
Qty: 30 TABLET | Refills: 1 | Status: SHIPPED | OUTPATIENT
Start: 2024-04-05

## 2024-07-23 DIAGNOSIS — R63.2 POLYPHAGIA: ICD-10-CM

## 2024-07-23 DIAGNOSIS — E66.3 OVERWEIGHT (BMI 25.0-29.9): ICD-10-CM

## 2024-08-26 DIAGNOSIS — E66.3 OVERWEIGHT (BMI 25.0-29.9): Primary | ICD-10-CM

## 2024-08-26 DIAGNOSIS — E88.819 INSULIN RESISTANCE: ICD-10-CM

## 2024-08-26 RX ORDER — TOPIRAMATE 100 MG/1
100 TABLET, FILM COATED ORAL 2 TIMES DAILY
Qty: 180 TABLET | Refills: 1 | OUTPATIENT
Start: 2024-08-26

## 2024-08-26 NOTE — PROGRESS NOTES
I was unable to renew Topiramate 100 mg bid. Last visit 1/26/24. Also, CBC and CMP must be drawn (per protocol to renew medication Topiramate). Orders for blood work are placed.

## 2025-01-14 ENCOUNTER — APPOINTMENT (OUTPATIENT)
Dept: DERMATOLOGY | Facility: CLINIC | Age: 60
End: 2025-01-14
Payer: COMMERCIAL

## 2025-01-17 ENCOUNTER — APPOINTMENT (OUTPATIENT)
Dept: DERMATOLOGY | Facility: CLINIC | Age: 60
End: 2025-01-17
Payer: COMMERCIAL

## 2025-01-17 DIAGNOSIS — D18.01 HEMANGIOMA OF SKIN: ICD-10-CM

## 2025-01-17 DIAGNOSIS — Z12.83 ENCOUNTER FOR SCREENING FOR MALIGNANT NEOPLASM OF SKIN: ICD-10-CM

## 2025-01-17 DIAGNOSIS — L82.1 SEBORRHEIC KERATOSIS: Primary | ICD-10-CM

## 2025-01-17 DIAGNOSIS — L81.4 LENTIGO: ICD-10-CM

## 2025-01-17 PROCEDURE — 99213 OFFICE O/P EST LOW 20 MIN: CPT | Performed by: NURSE PRACTITIONER

## 2025-01-17 PROCEDURE — 1036F TOBACCO NON-USER: CPT | Performed by: NURSE PRACTITIONER

## 2025-01-17 RX ORDER — SEMAGLUTIDE 2.68 MG/ML
INJECTION, SOLUTION SUBCUTANEOUS
COMMUNITY
Start: 2024-12-23

## 2025-01-17 NOTE — PROGRESS NOTES
Subjective     Radha Wynne is a 59 y.o. female who presents for the following: Skin Check (Pt presents to office for full skin exam.  Last full skin exam 01/10/2024.  Pt has no history of NMSC, denies family history.  Pt has scattered lesions of concern at this time. Chaperone offered and declined.//).     Review of Systems:  No other skin or systemic complaints other than what is documented elsewhere in the note.    The following portions of the chart were reviewed this encounter and updated as appropriate:  Tobacco  Allergies  Meds  Problems  Med Hx  Surg Hx  Fam Hx         Skin Cancer History  No skin cancer on file.      Specialty Problems          Dermatology Problems    Actinic keratosis    Hair loss    Pruritus, unspecified        Objective   Well appearing patient in no apparent distress; mood and affect are within normal limits.    A full examination was performed including scalp, head, eyes, ears, nose, lips, neck, chest, axillae, abdomen, back, buttocks, bilateral upper extremities, bilateral lower extremities, hands, feet, fingers, toes, fingernails, and toenails. All findings within normal limits unless otherwise noted below.    Assessment/Plan   1. Seborrheic keratosis  Stuck on verrucous, tan-brown papules and plaques.      Although Seborrheic Keratoses can be troublesome and unsightly, they are entirely benign.  Removal of Seborrheic Keratoses is considered a cosmetic procedure. Removal is typically performed using liquid nitrogen cryotherapy.  Treatment of current lesions does not prevent the development of new Seborrheic Keratoses in the future.    2. Encounter for screening for malignant neoplasm of skin  Scattered benign lesions    - Protective measures, such as avoiding skin exposure to sunlight during peak sun hours (10 AM to 3 PM), wearing protective clothing, and applying high-SPF sunscreen, are essential for reducing exposure to harmful ultraviolet (UV) light.  - Monthly  self-examination of the skin is helpful to detect new lesions or changes in existing lesions.  - Discussed signs and symptoms of sun-related skin cancers.   - Make sure your moles are not signs of skin cancer (melanoma). Remember the ABCDEs of melanoma lesions:  A - Asymmetry: One half of the lesion does not mirror the other half.  B - Border: The borders are irregular or vague (indistinct).  C - Color: More than one color may be noted within the mole.  D - Diameter: Size greater than 6 mm (roughly the size of a pencil eraser) may be concerning.  E - Evolving: Notable changes in the lesion over time are suspicious signs for skin cancer.    Related Procedures  Follow Up In Dermatology - Established Patient    3. Hemangioma of skin  Violaceous/red papule with maroon lagoons     - A cherry hemangioma is a small macule (small, flat, smooth area) or papule (small, solid bump) formed from an overgrowth of tiny blood vessels in the skin. Cherry hemangiomas are characteristically red or purplish in color. They often first appear in middle adulthood and usually increase in number with age. Cherry hemangiomas are noncancerous (benign) and are common in adults.  - Lesions are benign, reassured patient.     4. Lentigo  Scattered tan macules in sun-exposed areas.    A solar lentigo (plural, solar lentigines), sometimes called an age spot or liver spot, is a brown macule (small, flat, smooth area of skin) caused by chronic sun or artificial ultraviolet (UV) light exposure. There may be just one lentigo or there may be multiple. This type of lentigo is different from lentigo simplex (discussed separately) because it is caused by exposure to UV light. Solar lentigines are benign, but they do indicate excessive sun exposure, a risk factor for the development of skin cancer.  Lesions are benign, no treatment needed.       Follow up in 12 months for a total body skin exam. Please call me if there are any changes or development of  concerning symptoms (lesion/skin condition is changing, bleeding, enlarging, or worsening).

## 2025-01-20 ENCOUNTER — APPOINTMENT (OUTPATIENT)
Dept: OBSTETRICS AND GYNECOLOGY | Facility: CLINIC | Age: 60
End: 2025-01-20
Payer: COMMERCIAL

## 2025-02-13 ENCOUNTER — APPOINTMENT (OUTPATIENT)
Dept: OBSTETRICS AND GYNECOLOGY | Facility: CLINIC | Age: 60
End: 2025-02-13
Payer: COMMERCIAL

## 2025-02-13 VITALS
BODY MASS INDEX: 30.59 KG/M2 | HEIGHT: 62 IN | SYSTOLIC BLOOD PRESSURE: 117 MMHG | WEIGHT: 166.25 LBS | DIASTOLIC BLOOD PRESSURE: 70 MMHG

## 2025-02-13 DIAGNOSIS — R10.31 RLQ ABDOMINAL PAIN: Primary | ICD-10-CM

## 2025-02-13 DIAGNOSIS — Z12.31 BREAST CANCER SCREENING BY MAMMOGRAM: ICD-10-CM

## 2025-02-13 DIAGNOSIS — Z87.42 HISTORY OF VAGINAL DISCHARGE: ICD-10-CM

## 2025-02-13 PROCEDURE — 1036F TOBACCO NON-USER: CPT | Performed by: ADVANCED PRACTICE MIDWIFE

## 2025-02-13 PROCEDURE — 3008F BODY MASS INDEX DOCD: CPT | Performed by: ADVANCED PRACTICE MIDWIFE

## 2025-02-13 PROCEDURE — 99213 OFFICE O/P EST LOW 20 MIN: CPT | Performed by: ADVANCED PRACTICE MIDWIFE

## 2025-02-13 NOTE — PROGRESS NOTES
"GYN Problem note  Ruth Wynne  is a 59 y.o. who presents with   Chief Complaint   Patient presents with    Vaginal Discharge       Reports experiencing a gush of clear vaginal discharge about a month ago, felt like blood coming out when she used to have her period. No odor, irritation, or itching. Discharge gone now but reports an intermittent RLQ pain that she has had \"for a while\".    Physical Exam   Physical Exam  Constitutional:       Appearance: Normal appearance.   Genitourinary:      Vulva normal.      Moderate vaginal atrophy present.  HENT:      Head: Normocephalic and atraumatic.   Pulmonary:      Effort: Pulmonary effort is normal.   Musculoskeletal:         General: Normal range of motion.   Neurological:      General: No focal deficit present.      Mental Status: She is alert and oriented to person, place, and time.   Psychiatric:         Mood and Affect: Mood normal.         Behavior: Behavior normal.   Vitals reviewed.          Visit Vitals  /70            Assessment/Plan   Ruth \"Radha\" was seen today for vaginal discharge.  Diagnoses and all orders for this visit:    RLQ abdominal pain (Primary)  -     US PELVIS TRANSABDOMINAL WITH TRANSVAGINAL; Future    History of vaginal discharge  -     Vaginitis Gram Stain For Bacterial Vaginosis + Yeast      Dilia Velasquez, KAELYN-HERMILAM  "

## 2025-02-14 LAB — BV SCORE VAG QL: NORMAL

## 2025-03-09 DIAGNOSIS — M79.18 MUSCULOSKELETAL PAIN: Primary | ICD-10-CM

## 2025-03-10 ENCOUNTER — HOSPITAL ENCOUNTER (OUTPATIENT)
Dept: RADIOLOGY | Facility: CLINIC | Age: 60
End: 2025-03-10
Payer: COMMERCIAL

## 2025-03-13 ENCOUNTER — HOSPITAL ENCOUNTER (OUTPATIENT)
Dept: RADIOLOGY | Facility: CLINIC | Age: 60
End: 2025-03-13
Payer: COMMERCIAL

## 2025-03-16 RX ORDER — DICLOFENAC EPOLAMINE 0.01 G/1
SYSTEM TOPICAL
Qty: 180 PATCH | Refills: 3 | Status: SHIPPED | OUTPATIENT
Start: 2025-03-16

## 2025-03-24 ENCOUNTER — HOSPITAL ENCOUNTER (OUTPATIENT)
Dept: RADIOLOGY | Facility: CLINIC | Age: 60
Discharge: HOME | End: 2025-03-24
Payer: COMMERCIAL

## 2025-03-24 DIAGNOSIS — R10.31 RLQ ABDOMINAL PAIN: ICD-10-CM

## 2025-03-24 PROCEDURE — 76830 TRANSVAGINAL US NON-OB: CPT | Performed by: RADIOLOGY

## 2025-03-24 PROCEDURE — 76830 TRANSVAGINAL US NON-OB: CPT

## 2025-03-24 PROCEDURE — 76856 US EXAM PELVIC COMPLETE: CPT | Performed by: RADIOLOGY

## 2025-06-11 ENCOUNTER — HOSPITAL ENCOUNTER (OUTPATIENT)
Dept: RADIOLOGY | Facility: CLINIC | Age: 60
Discharge: HOME | End: 2025-06-11
Payer: COMMERCIAL

## 2025-06-11 ENCOUNTER — OFFICE VISIT (OUTPATIENT)
Dept: ORTHOPEDIC SURGERY | Facility: CLINIC | Age: 60
End: 2025-06-11
Payer: COMMERCIAL

## 2025-06-11 DIAGNOSIS — M79.646 PAIN OF FINGER, UNSPECIFIED LATERALITY: ICD-10-CM

## 2025-06-11 DIAGNOSIS — S62.631A CLOSED DISPLACED FRACTURE OF DISTAL PHALANX OF LEFT INDEX FINGER, INITIAL ENCOUNTER: ICD-10-CM

## 2025-06-11 PROCEDURE — 99213 OFFICE O/P EST LOW 20 MIN: CPT | Performed by: ORTHOPAEDIC SURGERY

## 2025-06-11 PROCEDURE — 1036F TOBACCO NON-USER: CPT | Performed by: ORTHOPAEDIC SURGERY

## 2025-06-11 PROCEDURE — 73140 X-RAY EXAM OF FINGER(S): CPT | Mod: LT

## 2025-06-11 PROCEDURE — 73140 X-RAY EXAM OF FINGER(S): CPT | Mod: LEFT SIDE | Performed by: RADIOLOGY

## 2025-06-11 PROCEDURE — 99212 OFFICE O/P EST SF 10 MIN: CPT | Performed by: ORTHOPAEDIC SURGERY

## 2025-06-11 ASSESSMENT — PAIN - FUNCTIONAL ASSESSMENT: PAIN_FUNCTIONAL_ASSESSMENT: 0-10

## 2025-06-11 ASSESSMENT — PAIN SCALES - GENERAL: PAINLEVEL_OUTOF10: 6
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
## 2025-06-11 NOTE — Clinical Note
June 12, 2025     Elizabeth Mays MD  [address]  [facility], [address]  [place] [address]    Patient: Radha Wynne   YOB: 1965   Date of Visit: 6/11/2025       Dear Dr. Elizabeth Mays MD:    Thank you for referring Radha Wynne to me for evaluation. Below are my notes for this consultation.  If you have questions, please do not hesitate to call me. I look forward to following your patient along with you.       Sincerely,     Bari Mueller MD      CC: No Recipients  ______________________________________________________________________________________

## 2025-06-23 PROBLEM — S62.631A CLOSED DISPLACED FRACTURE OF DISTAL PHALANX OF LEFT INDEX FINGER: Status: ACTIVE | Noted: 2025-06-23

## 2025-06-25 DIAGNOSIS — Z12.31 ENCOUNTER FOR SCREENING MAMMOGRAM FOR BREAST CANCER: ICD-10-CM

## 2026-01-23 ENCOUNTER — APPOINTMENT (OUTPATIENT)
Dept: DERMATOLOGY | Facility: CLINIC | Age: 61
End: 2026-01-23
Payer: COMMERCIAL